# Patient Record
Sex: MALE | Race: WHITE | NOT HISPANIC OR LATINO
[De-identification: names, ages, dates, MRNs, and addresses within clinical notes are randomized per-mention and may not be internally consistent; named-entity substitution may affect disease eponyms.]

---

## 2018-12-01 ENCOUNTER — TRANSCRIPTION ENCOUNTER (OUTPATIENT)
Age: 23
End: 2018-12-01

## 2019-04-01 PROBLEM — Z00.00 ENCOUNTER FOR PREVENTIVE HEALTH EXAMINATION: Status: ACTIVE | Noted: 2019-04-01

## 2019-04-22 ENCOUNTER — OUTPATIENT (OUTPATIENT)
Dept: OUTPATIENT SERVICES | Facility: HOSPITAL | Age: 24
LOS: 1 days | Discharge: HOME | End: 2019-04-22
Payer: COMMERCIAL

## 2019-04-22 DIAGNOSIS — R10.2 PELVIC AND PERINEAL PAIN: ICD-10-CM

## 2019-04-22 PROCEDURE — 76856 US EXAM PELVIC COMPLETE: CPT | Mod: 26

## 2019-05-30 ENCOUNTER — APPOINTMENT (OUTPATIENT)
Dept: SURGERY | Facility: CLINIC | Age: 24
End: 2019-05-30
Payer: COMMERCIAL

## 2019-05-30 VITALS
HEIGHT: 70 IN | OXYGEN SATURATION: 99 % | BODY MASS INDEX: 20.51 KG/M2 | TEMPERATURE: 96.2 F | DIASTOLIC BLOOD PRESSURE: 88 MMHG | HEART RATE: 60 BPM | WEIGHT: 143.25 LBS | SYSTOLIC BLOOD PRESSURE: 126 MMHG

## 2019-05-30 DIAGNOSIS — R10.31 RIGHT LOWER QUADRANT PAIN: ICD-10-CM

## 2019-05-30 DIAGNOSIS — Z78.9 OTHER SPECIFIED HEALTH STATUS: ICD-10-CM

## 2019-05-30 DIAGNOSIS — Z87.898 PERSONAL HISTORY OF OTHER SPECIFIED CONDITIONS: ICD-10-CM

## 2019-05-30 DIAGNOSIS — R10.9 UNSPECIFIED ABDOMINAL PAIN: ICD-10-CM

## 2019-05-30 DIAGNOSIS — Z86.39 PERSONAL HISTORY OF OTHER ENDOCRINE, NUTRITIONAL AND METABOLIC DISEASE: ICD-10-CM

## 2019-05-30 DIAGNOSIS — R19.09 OTHER INTRA-ABDOMINAL AND PELVIC SWELLING, MASS AND LUMP: ICD-10-CM

## 2019-05-30 PROCEDURE — 99203 OFFICE O/P NEW LOW 30 MIN: CPT

## 2019-05-30 RX ORDER — LEVOTHYROXINE SODIUM 0.07 MG/1
75 TABLET ORAL
Refills: 0 | Status: ACTIVE | COMMUNITY

## 2019-06-14 ENCOUNTER — FORM ENCOUNTER (OUTPATIENT)
Age: 24
End: 2019-06-14

## 2019-06-15 ENCOUNTER — APPOINTMENT (OUTPATIENT)
Dept: CT IMAGING | Facility: HOSPITAL | Age: 24
End: 2019-06-15

## 2019-06-15 ENCOUNTER — OUTPATIENT (OUTPATIENT)
Dept: OUTPATIENT SERVICES | Facility: HOSPITAL | Age: 24
LOS: 1 days | End: 2019-06-15
Payer: COMMERCIAL

## 2019-06-15 PROCEDURE — 74177 CT ABD & PELVIS W/CONTRAST: CPT | Mod: 26

## 2019-06-15 PROCEDURE — 74177 CT ABD & PELVIS W/CONTRAST: CPT

## 2019-11-02 ENCOUNTER — TRANSCRIPTION ENCOUNTER (OUTPATIENT)
Age: 24
End: 2019-11-02

## 2020-01-26 ENCOUNTER — TRANSCRIPTION ENCOUNTER (OUTPATIENT)
Age: 25
End: 2020-01-26

## 2020-10-19 ENCOUNTER — TRANSCRIPTION ENCOUNTER (OUTPATIENT)
Age: 25
End: 2020-10-19

## 2020-11-10 ENCOUNTER — TRANSCRIPTION ENCOUNTER (OUTPATIENT)
Age: 25
End: 2020-11-10

## 2020-12-28 ENCOUNTER — TRANSCRIPTION ENCOUNTER (OUTPATIENT)
Age: 25
End: 2020-12-28

## 2021-02-11 ENCOUNTER — TRANSCRIPTION ENCOUNTER (OUTPATIENT)
Age: 26
End: 2021-02-11

## 2021-03-31 ENCOUNTER — NON-APPOINTMENT (OUTPATIENT)
Age: 26
End: 2021-03-31

## 2021-04-01 ENCOUNTER — APPOINTMENT (OUTPATIENT)
Dept: OTOLARYNGOLOGY | Facility: CLINIC | Age: 26
End: 2021-04-01
Payer: MEDICAID

## 2021-04-01 ENCOUNTER — NON-APPOINTMENT (OUTPATIENT)
Age: 26
End: 2021-04-01

## 2021-04-01 DIAGNOSIS — R51.9 HEADACHE, UNSPECIFIED: ICD-10-CM

## 2021-04-01 DIAGNOSIS — J34.89 OTHER SPECIFIED DISORDERS OF NOSE AND NASAL SINUSES: ICD-10-CM

## 2021-04-01 DIAGNOSIS — Z85.830 PERSONAL HISTORY OF MALIGNANT NEOPLASM OF BONE: ICD-10-CM

## 2021-04-01 PROCEDURE — 99204 OFFICE O/P NEW MOD 45 MIN: CPT | Mod: 25

## 2021-04-01 PROCEDURE — 99072 ADDL SUPL MATRL&STAF TM PHE: CPT

## 2021-04-01 PROCEDURE — 31231 NASAL ENDOSCOPY DX: CPT

## 2021-04-01 NOTE — PHYSICAL EXAM
[Nasal Endoscopy Performed] : nasal endoscopy was performed, see procedure section for findings [Midline] : trachea located in midline position [Normal] : no rashes [de-identified] : Bilateral cerumen impaction

## 2021-04-01 NOTE — DATA REVIEWED
[de-identified] : CT sinus: \par 1. Mild upper medial left maxillary and minimal bilateral ethmoid sinus mucosal thickening.\par 2. No evidence of acute sinusitis\par 3. Rightward nasal septal deviation, nonspecific, mild/moderate left greater than right inferior nasal turbinate hypertrophy.

## 2021-04-01 NOTE — CONSULT LETTER
[Dear  ___] : Dear ~JOEY, [Consult Letter:] : I had the pleasure of evaluating your patient, [unfilled]. [Please see my note below.] : Please see my note below. [Consult Closing:] : Thank you very much for allowing me to participate in the care of this patient.  If you have any questions, please do not hesitate to contact me. [Sincerely,] : Sincerely, [FreeTextEntry2] : Dalila Leon NP [FreeTextEntry3] : Addis Shirley MD\par Otolaryngology - Head & Neck Surgery\par  [DrCris  ___] : Dr. VARGAS

## 2021-04-01 NOTE — ASSESSMENT
[FreeTextEntry1] : - will obtain MRI brain for daily headaches\par - sleep study as early morning HA can be a sign of ROSALINO\par - f/up after above

## 2021-04-01 NOTE — HISTORY OF PRESENT ILLNESS
[de-identified] : Patient presents today c/o sinuitis. he had CT performed on  03/5/21 for worsening HA/sinus pain. Most recent sinus/ ear  infection was 2 months ago, was prescribed amoxicillin 500mg.  Then 3 weeks latera sinus infection returned was given amoxicillin again. He denies any nasal congestion/obstruction, rhinorrhea or PND. Symptoms included right ear pain, frontal head pain, and pressure over his nose. 2 of these episodes in the past year, symptoms last about 10 days. \par \par Denies hx allergies. \par He has a h/o right sided frontal headaches that radiate to the back of his head and neck.  Headaches can be severe (10/10) He is being followed by neurology and takes Advil and then Sumatriptan if needed, which helps with headaches. Headaches worse in the morning. Not sure if he snores.

## 2021-05-09 ENCOUNTER — TRANSCRIPTION ENCOUNTER (OUTPATIENT)
Age: 26
End: 2021-05-09

## 2022-03-10 ENCOUNTER — APPOINTMENT (OUTPATIENT)
Dept: ENDOCRINOLOGY | Facility: CLINIC | Age: 27
End: 2022-03-10

## 2022-03-14 ENCOUNTER — EMERGENCY (EMERGENCY)
Facility: HOSPITAL | Age: 27
LOS: 0 days | Discharge: HOME | End: 2022-03-14
Attending: EMERGENCY MEDICINE | Admitting: EMERGENCY MEDICINE
Payer: MEDICAID

## 2022-03-14 VITALS
SYSTOLIC BLOOD PRESSURE: 142 MMHG | HEART RATE: 65 BPM | WEIGHT: 139.99 LBS | OXYGEN SATURATION: 97 % | TEMPERATURE: 98 F | RESPIRATION RATE: 17 BRPM | DIASTOLIC BLOOD PRESSURE: 72 MMHG
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
## 2022-03-14 DIAGNOSIS — S09.90XA UNSPECIFIED INJURY OF HEAD, INITIAL ENCOUNTER: ICD-10-CM
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
## 2022-03-14 PROCEDURE — 99283 EMERGENCY DEPT VISIT LOW MDM: CPT

## 2022-03-14 NOTE — ED PROVIDER NOTE - PATIENT PORTAL LINK FT
You can access the FollowMyHealth Patient Portal offered by [facility] by registering at the following website: [email or web address]. By joining [email or web address]’s FollowMyHealth portal, you will also be able to view your health information using other applications (apps) compatible with our system.

## 2022-03-14 NOTE — ED PROVIDER NOTE - NSFOLLOWUPINSTRUCTIONS_ED_ALL_ED_FT
Please follow up with your primary care doctor in 1-3 days  Please be aware of any new or worsening signs or symptoms that should prompt your return to the ER.        Head injury can be either closed or open (penetrating).  •A closed head injury means you received a hard blow to the head from striking an object, but the object did not break the skull.  •An open, or penetrating, head injury means you were hit with an object that broke the skull and entered the brain. This is more likely to happen when you move at high speed, such as going through the windshield during a car accident. It can also happen from a gunshot to the head.     Head injuries include:  •Concussion, in which the brain is shaken, is the most common type of traumatic brain injury.  •Scalp wounds.  •Skull fractures.     Head injuries may cause bleeding:  •In the brain tissue  •In the layers that surround the brain (subarachnoid hemorrhage, subdural hematoma, extradural hematoma)     Head injury is a common reason for an emergency room visit. A large number of people who suffer head injuries are children. Traumatic brain injury (TBI) accounts for over 1 in 6 injury-related hospital admissions each year.      Causes    Common causes of head injury include:   •Accidents at home, work, outdoors, or while playing sports  •Falls  •Physical assault  •Traffic accidents    Most of these injuries are minor because the skull protects the brain. Some injuries are severe enough to require a stay in the hospital    Overview    A concussion is a traumatic brain injury that affects your brain function. Effects are usually temporary but can include headaches and problems with concentration, memory, balance and coordination.    Concussions are usually caused by a blow to the head. Violently shaking of the head and upper body also can cause concussions.      Some concussions cause you to lose consciousness, but most do not.    Falls are the most common cause of concussion. Concussions are also common if you play a contact sport, such as football or soccer. Most people usually recover fully after a concussion.      Products & Services    Book: [facility] Guide to Raising a Healthy Child           Symptoms    The signs and symptoms of a concussion can be subtle and may not show up immediately. Symptoms can last for days, weeks or even longer.    Common symptoms after a concussive traumatic brain injury are headache, loss of memory (amnesia) and confusion. The amnesia usually involves forgetting the event that caused the concussion.    Physical signs and symptoms of a concussion may include:  •Headache  •Ringing in the ears  •Nausea  •Vomiting  •Fatigue or drowsiness  •Blurry vision    Other signs and symptoms of a concussion include:  •Confusion or feeling as if in a fog  •Amnesia surrounding the traumatic event  •Dizziness or "seeing stars"    A witness may observe these signs and symptoms in the concussed person:  •Temporary loss of consciousness (though this doesn't always occur)  •Slurred speech  •Delayed response to questions  •Dazed appearance  •Forgetfulness, such as repeatedly asking the same question    You may have some symptoms of concussions immediately, and some can occur for days after the injury, such as:  •Concentration and memory complaints  •Irritability and other personality changes  •Sensitivity to light and noise  •Sleep disturbances  •Psychological adjustment problems and depression  •Disorders of taste and smell    Symptoms in children    Head trauma is very common in young children. But concussions can be difficult to recognize in infants and toddlers because they can't describe how they feel. Concussion clues may include:  •Dazed appearance  •Listlessness and tiring easily  •Irritability and crankiness  •Loss of balance and unsteady walking  •Excessive crying  •Change in eating or sleeping patterns  •Lack of interest in favorite toys  •Vomiting  •Seizures    When to see a doctor    See a doctor within 1 to 2 days if:  •You or your child experiences a head injury, even if emergency care isn't required    If your child doesn't have signs of a serious head injury, remains alert, moves normally and responds to you, the injury is probably mild and usually doesn't need further testing.    In this case, if your child wants to nap, it's OK to let him or her sleep. If worrisome signs develop later, seek emergency care.    Seek emergency care for an adult or child who experiences a head injury and signs and symptoms such as:  •Repeated vomiting or nausea  •A loss of consciousness lasting longer than 30 seconds  •A headache that gets worse over time  •Fluid or blood draining from the nose or ears  •Vision or eye disturbances, such as pupils that are bigger than normal (dilated pupils) or pupils of unequal sizes  •Ringing in the ears that doesn't go away  •Weakness in the arms or legs  •Appearing very pale for longer than an hour  •Changes in behavior  •Confusion or disorientation, such as difficulty recognizing people or places  •Slurred speech or other changes in speech  •Obvious difficulty with mental function or physical coordination  •Changes in physical coordination, such as stumbling or clumsiness  •Seizures or convulsions  •Lasting or recurrent dizziness  •Symptoms that worsen over time  •Large head bumps or bruises on areas other than the forehead in children, especially in infants under 12 months of age    Athletes    Never return to play or vigorous activity while signs or symptoms of a concussion are present.    Experts recommend that an athlete with a suspected concussion not return to activities that are associated with a higher risk of another concussion while still showing concussion symptoms.    Children and adolescents should be evaluated by a health care professional trained in evaluating and managing pediatric concussions.    Experts also recommend that adult, child and adolescent athletes with concussions not return to play on the same day as the injury.    Request an Appointment at [facility]    Causes     Damage in different areas of the brain based on injury type   Types of concussion injuries   Open pop-up dialog box                  Damage in different areas of the brain based on injury type                Your brain has the consistency of gelatin. It's cushioned from everyday jolts and bumps by cerebrospinal fluid inside your skull.    A violent blow to your head and neck or upper body can cause your brain to slide back and forth forcefully against the inner walls of your skull.    Sudden acceleration or deceleration of the head, caused by events such as a car crash or being violently shaken, also can cause brain injury.    These injuries affect brain function, usually for a brief period, resulting in signs and symptoms of concussion.    This type of brain injury may lead to bleeding in or around your brain, causing symptoms such as prolonged drowsiness and confusion. These symptoms may develop immediately or later.    Such bleeding in your brain can be fatal. That's why anyone who experiences a brain injury needs monitoring in the hours afterward and emergency care if symptoms worsen.    Risk factors    Activities and factors that may increase your risk of a concussion include:  •Falling, especially in young children and older adults  •Participating in a high-risk sport, such as football, hockey, soccer, rugby, boxing or other contact sport  •Participating in high-risk sports without proper safety equipment and supervision  •Being involved in a motor vehicle collision  •Being involved in a pedestrian or bicycle accident  •Being a soldier involved in combat  •Being a victim of physical abuse  •Having had a previous concussion    Complications    Potential complications of concussion include:  •Post-traumatic headaches. Some people experience concussion-related headaches up to seven days after a brain injury.  •Post-traumatic vertigo. Some people experience a sense of spinning or dizziness for days, weeks or months after a brain injury.  •Persistent post-concussive symptoms (post-concussive syndrome). A small proportion of people (15% to 20%) may have symptoms including headaches, dizziness and thinking difficulties that persist beyond three weeks. If these symptoms persist beyond three months, this becomes characterized as persistent post-concussive symptoms.  •Cumulative effects of multiple brain injuries. Active research is currently underway to study the effects of repeated head injuries that don't cause symptoms (subconcussive injury). At this time, there's no conclusive evidence indicating that repeated brain injuries contribute to cumulative effects.    •Second impact syndrome. Rarely, experiencing a second concussion before signs and symptoms of a first concussion have resolved may result in rapid and usually fatal brain swelling.    It's important for athletes never to return to sports while they're still experiencing signs and symptoms of concussion.      Prevention    Some tips that may help you to prevent or minimize your risk of head injury include:    •Wearing protective gear during sports and other recreational activities. Make sure the equipment fits properly, is well maintained and is worn correctly. Follow the rules of the game and practice good sportsmanship.    When bicycling, motorcycling, snowboarding or engaging in any recreational activity that may result in head injury, wear protective headgear.    •Buckling your seat belt. Wearing a seat belt may prevent serious injury, including head injury, during a traffic accident.  •Making your home safe. Keep your home well lit and your floors free of anything that might cause you to trip and fall. Falls around the home are a leading cause of head injury.  •Protecting your children. To help lessen the risk of head injuries to your children, block off stairways and install window guards.  •Exercising regularly. Exercise regularly to strengthen your leg muscles and improve your balance.  • Educating others about concussions. Educating coaches, athletes, parents and others about concussions can help spread awareness. Coaches and parents can also help encourage good sportsmanship.

## 2022-03-14 NOTE — ED PROVIDER NOTE - CLINICAL SUMMARY MEDICAL DECISION MAKING FREE TEXT BOX
Patient presented s/p head trauma yesterday >12h PTA. Patient states he was skiing at which time he slipped backwards and hit his head. No LOC. Since then has been having posterior headache and mild nausea but otherwise no vomiting, numbness/weakness, vision changes or any other complaints. Afebrile, HD stable, fully neurovascularly intact, without active headache at time of evaluation. Patient's accident occurred >12h PTA and with normal neuro exam, no current symptoms, no signs of external trauma, no significant concern for emergent intracranial injury requiring imaging at this time. Will discharge home with outpatient concussion clinic f/u. Patient agreeable with plan. Agrees to return to ED for any new or worsening symptoms.

## 2022-03-14 NOTE — ED PROVIDER NOTE - OBJECTIVE STATEMENT
26 year old male, past medical history migraines, who presents s/p head injury. patient states he was skiing yesterday when he slipped back hitting back of head, no loc. patient reports occipital headache, nausea since. denies vision changes, tinnitus, vomiting, numbness/weakness.

## 2022-03-14 NOTE — ED PROVIDER NOTE - PHYSICAL EXAMINATION
CONSTITUTIONAL: Well-developed; well-nourished; in no acute distress, nontoxic appearing  SKIN: skin exam is warm and dry  HEAD: Normocephalic; atraumatic  EYES: PERRL, no nystagmus, EOM intact; conjunctiva and sclera clear.  ENT: MMM   NECK: no midline c-spine tenderness  CARD: S1, S2 normal, no murmur  RESP: Good air movement bilaterally  EXT: Normal ROM.    NEURO: awake, alert, following commands, oriented, grossly unremarkable. No Focal deficits. GCS 15.   PSYCH: Cooperative, appropriate.

## 2022-03-14 NOTE — ED PROVIDER NOTE - NS ED ATTENDING STATEMENT MOD
This was a shared visit with the EDUARDO. I reviewed and verified the documentation and independently performed the documented:

## 2022-03-14 NOTE — ED PROVIDER NOTE - CARE PROVIDER_API CALL
Emerson Parikh (MD)  Internal Medicine  [address] [place] [address]  Phone: [phone]  Fax: [phone]  Follow Up Time: Routine

## 2022-03-14 NOTE — ED PROVIDER NOTE - NSFOLLOWUPCLINICS_GEN_ALL_ED_FT
[facility] Concussion Program  Concussion Program  [address] [place] [address]   Phone: [phone]  Fax:   Follow Up Time: 4-6 Days

## 2022-04-04 ENCOUNTER — APPOINTMENT (OUTPATIENT)
Dept: NEUROSURGERY | Facility: CLINIC | Age: 27
End: 2022-04-04

## 2022-11-14 ENCOUNTER — APPOINTMENT (OUTPATIENT)
Dept: NEUROSURGERY | Facility: CLINIC | Age: 27
End: 2022-11-14

## 2022-12-05 ENCOUNTER — NON-APPOINTMENT (OUTPATIENT)
Age: 27
End: 2022-12-05

## 2022-12-05 ENCOUNTER — APPOINTMENT (OUTPATIENT)
Dept: NEUROSURGERY | Facility: CLINIC | Age: 27
End: 2022-12-05

## 2022-12-05 VITALS
SYSTOLIC BLOOD PRESSURE: 116 MMHG | DIASTOLIC BLOOD PRESSURE: 79 MMHG | BODY MASS INDEX: 20.04 KG/M2 | RESPIRATION RATE: 18 BRPM | TEMPERATURE: 98.6 F | OXYGEN SATURATION: 98 % | HEART RATE: 84 BPM | HEIGHT: 70 IN | WEIGHT: 140 LBS

## 2022-12-05 PROCEDURE — 99204 OFFICE O/P NEW MOD 45 MIN: CPT

## 2022-12-05 NOTE — HISTORY OF PRESENT ILLNESS
[de-identified] : 26 year old male with history of fatigue and headaches underwent MRI ordered by his endocrinologist. He was found to have a 3mm area of hypo enhancement on an MRI at [place] 5/2021 followed by a normal MRI 10/2021.\par Labs 6/2021\par FSH 33 (elev)\par LH 12.4 (elev)\par ACTH 13\par GH <0.1\par \par AM Cortisol 1.8\par 24 hour urine cortisol 157 (WNL)\par \par He reportedly had an elevated midnight salivary cortisol (0.15) with a normal 24 hour urine cortisol August 2022.\par \par He is schedule to repeat labs with Dr. Giana Deng in 2 weeks at [facility]. \par

## 2022-12-05 NOTE — ASSESSMENT
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   The patient’s established problem of  hypercortisolemia is likely unrelated.  The differential diagnosis is adenoma vs exogenous cortisol production.  Her KPS is . I had a long discussion with the patient regarding the role of proceeding with repeat endocrine workup in the coming weeks.  The patient was extensively educated about the nature of his disease process. Therapeutic and diagnostic tests include endocrine panel.  The patient should see Dr. Deng.  I will see the patient back after his follow up labs. I have explained the alternatives, risks and benefits to the patient and he understands and agrees to proceed.  This risk of the procedure including but not limited to pain, infection, seizure, stroke, recurrence, residual disease, neurovascular injury, heart attack, pulmonary embolism, blindness, weakness, paralysis and death have been carefully explained to the patient who clearly understands and agrees to proceed.\par

## 2022-12-05 NOTE — PHYSICAL EXAM
[General Appearance - Alert] : alert [General Appearance - In No Acute Distress] : in no acute distress [Person] : oriented to person [Place] : oriented to place [Time] : oriented to time [Cranial Nerves Optic (II)] : visual acuity intact bilaterally,  pupils equal round and reactive to light [Cranial Nerves Oculomotor (III)] : extraocular motion intact [Cranial Nerves Trigeminal (V)] : facial sensation intact symmetrically [Cranial Nerves Facial (VII)] : face symmetrical [Cranial Nerves Vestibulocochlear (VIII)] : hearing was intact bilaterally [Cranial Nerves Glossopharyngeal (IX)] : tongue and palate midline [Cranial Nerves Accessory (XI - Cranial And Spinal)] : head turning and shoulder shrug symmetric [Cranial Nerves Hypoglossal (XII)] : there was no tongue deviation with protrusion [Motor Tone] : muscle tone was normal in all four extremities [Motor Handedness Right-Handed] : the patient is right hand dominant [Sclera] : the sclera and conjunctiva were normal [PERRL With Normal Accommodation] : pupils were equal in size, round, reactive to light, with normal accommodation [Extraocular Movements] : extraocular movements were intact [Full Visual Field] : full visual field [] : no respiratory distress [Abnormal Walk] : normal gait

## 2022-12-05 NOTE — DATA REVIEWED
[de-identified] : I have reviewed the most recent MRI which shows a questionable area of hypoenhancement within the left pituitary